# Patient Record
Sex: MALE | Race: WHITE | NOT HISPANIC OR LATINO | URBAN - METROPOLITAN AREA
[De-identification: names, ages, dates, MRNs, and addresses within clinical notes are randomized per-mention and may not be internally consistent; named-entity substitution may affect disease eponyms.]

---

## 2023-12-06 ENCOUNTER — EMERGENCY (EMERGENCY)
Facility: HOSPITAL | Age: 78
LOS: 1 days | Discharge: HOME | End: 2023-12-06
Admitting: EMERGENCY MEDICINE
Payer: MEDICARE

## 2023-12-06 VITALS
WEIGHT: 126.1 LBS | HEART RATE: 58 BPM | TEMPERATURE: 98 F | SYSTOLIC BLOOD PRESSURE: 132 MMHG | DIASTOLIC BLOOD PRESSURE: 54 MMHG | OXYGEN SATURATION: 99 % | RESPIRATION RATE: 18 BRPM

## 2023-12-06 VITALS
OXYGEN SATURATION: 98 % | RESPIRATION RATE: 18 BRPM | DIASTOLIC BLOOD PRESSURE: 51 MMHG | HEART RATE: 53 BPM | TEMPERATURE: 98 F | SYSTOLIC BLOOD PRESSURE: 115 MMHG

## 2023-12-06 DIAGNOSIS — Z98.89 OTHER SPECIFIED POSTPROCEDURAL STATES: Chronic | ICD-10-CM

## 2023-12-06 PROCEDURE — 99282 EMERGENCY DEPT VISIT SF MDM: CPT

## 2023-12-06 PROCEDURE — 99283 EMERGENCY DEPT VISIT LOW MDM: CPT

## 2023-12-06 NOTE — ED ADULT NURSE NOTE - OBJECTIVE STATEMENT
78y Male to ED "Because I saw a hematologist a few week ago. They tested my blood 78y Male to ED "Because I saw a hematologist a few week ago. They tested my blood to see if my platelets are too high. They never got back to me". Pt denies ecchymosis, chest pain, fever, chills, dizziness. Ambulates unassigned and at baseline.

## 2023-12-06 NOTE — ED PROVIDER NOTE - PATIENT PORTAL LINK FT
You can access the FollowMyHealth Patient Portal offered by [facility] by registering at the following website: [email or web address]. By joining [employer]’s FollowMyHealth portal, you will also be able to view your health information using other applications (apps) compatible with our system. You can access the FollowMyHealth Patient Portal offered by [facility] by registering at the following website: [email or web address]. By joining [employer]’s FollowMyHealth portal, you will also be able to view your health information using other applications (apps) compatible with our system.

## 2023-12-06 NOTE — ED PROVIDER NOTE - CLINICAL SUMMARY MEDICAL DECISION MAKING FREE TEXT BOX
77 y/o male found to have an elevated plt of 1054 on 11/29 here requesting for review of his recent blood work and to assist with his hydroxyurea prescription. Attempted to discuss case with Dr. Mazariegos, left messages, unable to successfully make contact. However, pt states while awaiting dispo, the clinic called and processed his prescription and directed him to the pharmacy to  his meds. Offered to conduct blood work, pt declined and now requesting dc since his prescription was sent and submitted for . I have discussed the discharge plan with the patient. The patient agrees with the plan, as discussed.  The patient understands Emergency Department diagnosis is a preliminary diagnosis often based on limited information and that the patient must adhere to the follow-up plan as discussed.  The patient understands that if the symptoms worsen or if prescribed medications do not have the desired/planned effect that the patient may return to the Emergency Department at any time for further evaluation and treatment.

## 2023-12-06 NOTE — ED ADULT TRIAGE NOTE - CHIEF COMPLAINT QUOTE
Pt states "I went to a cardiologist and got blood work and they never followed up, the nurse upstairs taking care of my wife told me to come down here." Pt reports hx of HTN, not currently taking medications.

## 2023-12-06 NOTE — ED ADULT NURSE NOTE - NSFALLUNIVINTERV_ED_ALL_ED
Bed/Stretcher in lowest position, wheels locked, appropriate side rails in place/Call bell, personal items and telephone in reach/Instruct patient to call for assistance before getting out of bed/chair/stretcher/Non-slip footwear applied when patient is off stretcher/[place] to call system/Physically safe environment - no spills, clutter or unnecessary equipment/Purposeful proactive rounding/Room/bathroom lighting operational, light cord in reach Bed/Stretcher in lowest position, wheels locked, appropriate side rails in place/Call bell, personal items and telephone in reach/Instruct patient to call for assistance before getting out of bed/chair/stretcher/Non-slip footwear applied when patient is off stretcher/[place] to call system/Physically safe environment - no spills, clutter or unnecessary equipment/Purposeful proactive rounding/Room/bathroom lighting operational, light cord in reach

## 2023-12-06 NOTE — ED PROVIDER NOTE - OBJECTIVE STATEMENT
79 y/o male here in the ER 79 y/o male here in the ER requesting for medication refill. Pt states his primary care doctor noted elevated platelets and was referred to hematology. Pt states he saw his hematologist and had blood work conducted on 11/29, currently awaiting for results. Pt reports he was advised to take hydroxyurea, however states he has not received his prescription or his blood work results. Pt here requesting assistance for his prescription and review of his blood work. 77 y/o male here in the ER requesting for medication refill. Pt states his primary care doctor noted elevated platelets and was referred to hematology. Pt states he saw his hematologist and had blood work conducted on 11/29, currently awaiting for results. Pt reports he was advised to take hydroxyurea, however states he has not received his prescription or his blood work results. Pt here requesting assistance for his prescription and review of his blood work.

## 2023-12-06 NOTE — ED PROVIDER NOTE - NSFOLLOWUPINSTRUCTIONS_ED_ALL_ED_FT
Please follow up at the pharmacy to  your prescription and follow up with Dr. Mazariegos. Return to the Emergency Department if you have any new or worsening symptoms, or if you have any concerns.

## 2023-12-06 NOTE — ED PROVIDER NOTE - NS ED ROS FT
General: no fever, chills, confusion  Cardiac: no chest pain, chest tightness, palpitations  Lungs: no sob, difficulty breathing  Abdomen: no abdominal pain, nausea, vomiting, diarrhea, constipation, nml BM  : no dysuria, urinary frequency/urgency  SKIN: no bleeding or ecchymosis     All other systems negative except as per HPI

## 2023-12-09 DIAGNOSIS — R79.89 OTHER SPECIFIED ABNORMAL FINDINGS OF BLOOD CHEMISTRY: ICD-10-CM

## 2024-11-01 ENCOUNTER — NON-APPOINTMENT (OUTPATIENT)
Age: 79
End: 2024-11-01

## 2024-11-01 ENCOUNTER — APPOINTMENT (OUTPATIENT)
Dept: NEUROLOGY | Facility: CLINIC | Age: 79
End: 2024-11-01
Payer: MEDICARE

## 2024-11-01 VITALS
HEIGHT: 65 IN | OXYGEN SATURATION: 97 % | BODY MASS INDEX: 20.33 KG/M2 | DIASTOLIC BLOOD PRESSURE: 75 MMHG | HEART RATE: 62 BPM | WEIGHT: 122 LBS | TEMPERATURE: 97.9 F | SYSTOLIC BLOOD PRESSURE: 101 MMHG

## 2024-11-01 DIAGNOSIS — Z82.49 FAMILY HISTORY OF ISCHEMIC HEART DISEASE AND OTHER DISEASES OF THE CIRCULATORY SYSTEM: ICD-10-CM

## 2024-11-01 DIAGNOSIS — Z86.39 PERSONAL HISTORY OF OTHER ENDOCRINE, NUTRITIONAL AND METABOLIC DISEASE: ICD-10-CM

## 2024-11-01 DIAGNOSIS — R51.9 HEADACHE, UNSPECIFIED: ICD-10-CM

## 2024-11-01 PROCEDURE — 99204 OFFICE O/P NEW MOD 45 MIN: CPT

## 2024-11-01 RX ORDER — TAMSULOSIN HYDROCHLORIDE 0.4 MG/1
0.4 CAPSULE ORAL
Refills: 0 | Status: ACTIVE | COMMUNITY

## 2024-11-01 RX ORDER — LOSARTAN POTASSIUM 25 MG/1
25 TABLET, FILM COATED ORAL DAILY
Refills: 0 | Status: ACTIVE | COMMUNITY

## 2024-11-01 RX ORDER — ATORVASTATIN CALCIUM 80 MG/1
80 TABLET, FILM COATED ORAL DAILY
Refills: 0 | Status: ACTIVE | COMMUNITY

## 2024-11-01 RX ORDER — HYDROXYUREA 500 MG/1
500 CAPSULE ORAL
Refills: 0 | Status: ACTIVE | COMMUNITY

## 2024-11-01 RX ORDER — ASPIRIN 81 MG/1
81 TABLET ORAL DAILY
Refills: 0 | Status: ACTIVE | COMMUNITY

## 2024-11-01 RX ORDER — METOPROLOL SUCCINATE 50 MG/1
50 TABLET, EXTENDED RELEASE ORAL DAILY
Refills: 0 | Status: ACTIVE | COMMUNITY

## 2024-11-01 RX ORDER — ISOSORBIDE MONONITRATE 30 MG
30 TABLET, EXTENDED RELEASE 24 HR ORAL DAILY
Refills: 0 | Status: ACTIVE | COMMUNITY

## 2024-11-01 RX ORDER — PANTOPRAZOLE 40 MG/1
40 TABLET, DELAYED RELEASE ORAL
Refills: 0 | Status: ACTIVE | COMMUNITY

## 2024-11-01 RX ORDER — VITAMIN E 268 MG
100 CAPSULE ORAL
Refills: 0 | Status: ACTIVE | COMMUNITY

## 2024-11-04 ENCOUNTER — APPOINTMENT (OUTPATIENT)
Dept: MRI IMAGING | Facility: HOSPITAL | Age: 79
End: 2024-11-04
Payer: MEDICARE

## 2024-11-04 ENCOUNTER — OUTPATIENT (OUTPATIENT)
Dept: OUTPATIENT SERVICES | Facility: HOSPITAL | Age: 79
LOS: 1 days | End: 2024-11-04

## 2024-11-04 DIAGNOSIS — Z98.89 OTHER SPECIFIED POSTPROCEDURAL STATES: Chronic | ICD-10-CM

## 2024-11-04 PROCEDURE — 70551 MRI BRAIN STEM W/O DYE: CPT | Mod: 26,MH

## 2024-11-05 ENCOUNTER — NON-APPOINTMENT (OUTPATIENT)
Age: 79
End: 2024-11-05

## 2024-11-20 PROCEDURE — 70551 MRI BRAIN STEM W/O DYE: CPT

## 2024-11-26 ENCOUNTER — APPOINTMENT (OUTPATIENT)
Dept: NEUROLOGY | Facility: CLINIC | Age: 79
End: 2024-11-26

## 2025-01-13 ENCOUNTER — APPOINTMENT (OUTPATIENT)
Dept: NEUROLOGY | Facility: CLINIC | Age: 80
End: 2025-01-13

## 2025-03-11 ENCOUNTER — APPOINTMENT (OUTPATIENT)
Dept: NEUROLOGY | Facility: CLINIC | Age: 80
End: 2025-03-11